# Patient Record
Sex: FEMALE | URBAN - METROPOLITAN AREA
[De-identification: names, ages, dates, MRNs, and addresses within clinical notes are randomized per-mention and may not be internally consistent; named-entity substitution may affect disease eponyms.]

---

## 2022-12-14 ENCOUNTER — OFFICE VISIT (OUTPATIENT)
Dept: INTERNAL MEDICINE CLINIC | Age: 37
End: 2022-12-14
Payer: MEDICAID

## 2022-12-14 VITALS
BODY MASS INDEX: 35.9 KG/M2 | WEIGHT: 256.4 LBS | OXYGEN SATURATION: 97 % | HEART RATE: 78 BPM | HEIGHT: 71 IN | RESPIRATION RATE: 16 BRPM | SYSTOLIC BLOOD PRESSURE: 139 MMHG | TEMPERATURE: 97.1 F | DIASTOLIC BLOOD PRESSURE: 79 MMHG

## 2022-12-14 DIAGNOSIS — Z76.89 ESTABLISHING CARE WITH NEW DOCTOR, ENCOUNTER FOR: Primary | ICD-10-CM

## 2022-12-14 DIAGNOSIS — E66.9 OBESITY, CLASS II, BMI 35-39.9: ICD-10-CM

## 2022-12-14 DIAGNOSIS — B00.1 HERPES LABIALIS: ICD-10-CM

## 2022-12-14 DIAGNOSIS — Z98.84 WEIGHT GAIN FOLLOWING GASTRIC BYPASS SURGERY: ICD-10-CM

## 2022-12-14 DIAGNOSIS — R63.5 WEIGHT GAIN FOLLOWING GASTRIC BYPASS SURGERY: ICD-10-CM

## 2022-12-14 PROCEDURE — 99203 OFFICE O/P NEW LOW 30 MIN: CPT | Performed by: INTERNAL MEDICINE

## 2022-12-14 RX ORDER — VALACYCLOVIR HYDROCHLORIDE 500 MG/1
500 TABLET, FILM COATED ORAL 2 TIMES DAILY
Qty: 6 TABLET | Refills: 2 | Status: SHIPPED | OUTPATIENT
Start: 2022-12-14

## 2022-12-14 NOTE — PROGRESS NOTES
HISTORY OF PRESENT ILLNESS  Estrellita Coronel is a 39 y.o. female. Patient comes to establish PCP. Patient has no complaints today. She has a history of gastric bypass surgery in 2016 lost 130-140 lbs from 340 lbs. Went down to 185 lbs. 2019 she was going to [facility]"  with surgeon who retired. She has not seen a doctor for her weight control since 2019. She is following up with heme-onc for anemia and iron infusions. Hem/Onc in Dec. Follow up appt 5th January. Patient claims to had several blood work done recently this month in heme-onc office. She will ask them to fax to us her records. She is also going to March the records as well. She does not remember what blood work she got done but she mention it was several tests as well as vaccinations. Cold sores  Using valtrex prn episodes with good response    Gastric bypass surgery/abdominoplasty 2016  Weight before surgery 340 pounds, weight after surgery 185 pounds (lowest). Current weight 256 pounds. PAST MEDICAL HISTORY  Medical: Anemia, Hx of by-pass gastric surgery and obesity, cold sores. Surgery: Cholecystectomy and gastric bypass. Allergies: denied, but mentioned bad reaction to some special type of IV iron. Sherrill  Will evaluate next appointment. Establish Care  Pertinent negatives include no chest pain and no shortness of breath. Review of Systems   Constitutional:  Negative for chills and fever. HENT:  Negative for congestion. Respiratory:  Negative for cough and shortness of breath. Cardiovascular:  Negative for chest pain, palpitations and leg swelling. Visit Vitals  /79 (BP 1 Location: Left arm, BP Patient Position: Sitting, BP Cuff Size: Large adult)   Pulse 78   Temp 97.1 °F (36.2 °C) (Temporal)   Resp 16   Ht 5' 11\" (1.803 m)   Wt 256 lb 6.4 oz (116.3 kg)   LMP 11/24/2022   SpO2 97%   BMI 35.76 kg/m²     Physical Exam  Constitutional:       Appearance: She is obese.    HENT: Mouth/Throat:      Mouth: Mucous membranes are moist.      Pharynx: Oropharynx is clear. Eyes:      Extraocular Movements: Extraocular movements intact. Conjunctiva/sclera: Conjunctivae normal.      Pupils: Pupils are equal, round, and reactive to light. Cardiovascular:      Rate and Rhythm: Normal rate and regular rhythm. Pulses: Normal pulses. Heart sounds: Normal heart sounds. Pulmonary:      Effort: Pulmonary effort is normal.      Breath sounds: Normal breath sounds. Abdominal:      General: Bowel sounds are normal.      Palpations: Abdomen is soft. Musculoskeletal:      Cervical back: Normal range of motion. Lymphadenopathy:      Cervical: No cervical adenopathy. Skin:     General: Skin is warm. Neurological:      Mental Status: She is alert and oriented to person, place, and time. Psychiatric:         Mood and Affect: Mood normal.       ASSESSMENT and PLAN    ICD-10-CM ICD-9-CM    1. Establishing care with new doctor, encounter for  Z76.89 V65.8       2. Herpes labialis  B00.1 054.9 valACYclovir (VALTREX) 500 mg tablet      3. Obesity, Class II, BMI 35-39.9  E66.9 278.00 REFERRAL TO DIETITIAN      4. Weight gain following gastric bypass surgery  R63.5 783.1 REFERRAL TO DIETITIAN    Z98.84          Follow-up and Dispositions    Return in about 3 months (around 3/14/2023) for HM, labs work up general..     Patient comes to establish PCP. We will request records patient will have records from heme-onc faxed to us. Valtrex refilled for herpes labialis which usually happens twice a year. Referral to dietitian for weight control after gastric bypass surgery. After reviewing heme-onc test notes and vaccination records we will go ahead and order remaining pending tests for evaluation of obesity and vitamin deficiencies. Follow-up in 3 months.

## 2023-01-10 ENCOUNTER — VIRTUAL VISIT (OUTPATIENT)
Dept: INTERNAL MEDICINE CLINIC | Age: 38
End: 2023-01-10
Payer: MEDICAID

## 2023-01-10 DIAGNOSIS — B96.89 ACUTE BACTERIAL SINUSITIS: Primary | ICD-10-CM

## 2023-01-10 DIAGNOSIS — J01.90 ACUTE BACTERIAL SINUSITIS: Primary | ICD-10-CM

## 2023-01-10 PROCEDURE — 99213 OFFICE O/P EST LOW 20 MIN: CPT | Performed by: INTERNAL MEDICINE

## 2023-01-10 RX ORDER — TRAMADOL HYDROCHLORIDE AND ACETAMINOPHEN 37.5; 325 MG/1; MG/1
1 TABLET, FILM COATED ORAL
Qty: 8 TABLET | Refills: 0 | Status: SHIPPED | OUTPATIENT
Start: 2023-01-10 | End: 2023-01-14

## 2023-01-10 RX ORDER — AMOXICILLIN 875 MG/1
875 TABLET, FILM COATED ORAL 2 TIMES DAILY
Qty: 10 TABLET | Refills: 0 | Status: SHIPPED | OUTPATIENT
Start: 2023-01-10 | End: 2023-01-15

## 2023-01-10 NOTE — PROGRESS NOTES
For virtual visit patient would like to receive link via TEXT/EMAIL: text     Job Paulette is a 44 y.o. female (: 1985) evaluated via telephone on 1/10/2023 to address:    Chief Complaint   Patient presents with    Pressure Behind the Eyes     X 4 days       There were no vitals filed for this visit. Allergies Reviewed. Learning Assessment:   No flowsheet data found. Depression Screening:     3 most recent PHQ Screens 1/10/2023   Little interest or pleasure in doing things Not at all   Feeling down, depressed, irritable, or hopeless Not at all   Total Score PHQ 2 0     Fall Risk Assessment:   No flowsheet data found. Abuse Screening:   No flowsheet data found. Coordination of Care Questionaire:   1. Have you been to the ER, urgent care clinic since your last visit? Hospitalized since your last visit? NO    2. Have you seen or consulted any other health care providers outside of the [address] [place] [address] since your last visit? Include any pap smears or colon screening. YES    Advanced Directive:   1. Do you have an Advanced Directive? NO    2. Would you like information on Advanced Directives?  NO

## 2023-01-10 NOTE — PROGRESS NOTES
Nuria Burnham (: 1985) is a 44 y.o. female, established patient, here for evaluation of the following chief complaint(s):   Pressure Behind the Eyes (X 4 days)     Face hurts (mostly start at the chick bones, right side more than left) and teeth \"kind of hurts\" 5 days ago about the same without changes. Denied fever, but may have presented chills and sweats. Started like a cold with sneezing. A tiny \"tickle in her throat and congestion\". Endorsed yellowish phlegm. Not wearing masks at all times. Flu shot on beginning of Dec 2022. Denied allergies to meds, food, or drinks. I will send treatment for acute sinusitis. She will follow up with me as scheduled, and will let me know if it worsen. If she present any side effect, will stop treatment, go to ED, and let me know. ASSESSMENT/PLAN:  Below is the assessment and plan developed based on review of pertinent history, labs, studies, and medications. 1. Acute bacterial sinusitis  -     amoxicillin (AMOXIL) 875 mg tablet; Take 1 Tablet by mouth two (2) times a day for 5 days. Indications: acute bacterial infection of the sinuses, Normal, Disp-10 Tablet, R-0If swelling, skin rash, abdominal pain, nausea or any other side effect, stop treatment and see ED.  -     traMADol-acetaminophen (ULTRACET) 37.5-325 mg per tablet; Take 1 Tablet by mouth every twelve (12) hours as needed for Pain for up to 4 days. Max Daily Amount: 2 Tablets. Indications: pain, Normal, Disp-8 Tablet, R-0If swelling, skin rash, abdominal pain, nausea or any other side effect, stop treatment and see ED. No follow-ups on file.     SUBJECTIVE/OBJECTIVE:  HPI    Review of Systems     No data recorded     Physical Exam    [INSTRUCTIONS:  \"[x]\" Indicates a positive item  \"[]\" Indicates a negative item  -- DELETE ALL ITEMS NOT EXAMINED]    Constitutional: [x] Appears well-developed and well-nourished [x] No apparent distress      [] Abnormal -     Mental status: [x] Alert and awake [x] Oriented to person/place/time [x] Able to follow commands    [] Abnormal -     Eyes:   EOM    [x]  Normal    [] Abnormal -   Sclera  [x]  Normal    [] Abnormal -          Discharge [x]  None visible   [] Abnormal -     HENT: [x] Normocephalic, atraumatic  [] Abnormal -   [x] Mouth/Throat: Mucous membranes are moist    External Ears [x] Normal  [] Abnormal -    Neck: [x] No visualized mass [] Abnormal -     Pulmonary/Chest: [x] Respiratory effort normal   [x] No visualized signs of difficulty breathing or respiratory distress        [] Abnormal -      Musculoskeletal:   [x] Normal gait with no signs of ataxia         [x] Normal range of motion of neck        [] Abnormal -     Neurological:        [x] No Facial Asymmetry (Cranial nerve 7 motor function) (limited exam due to video visit)          [x] No gaze palsy        [] Abnormal -          Skin:        [x] No significant exanthematous lesions or discoloration noted on facial skin         [] Abnormal -            Psychiatric:       [x] Normal Affect [] Abnormal -        [x] No Hallucinations    Other pertinent observable physical exam findings:-        Daniel Mi, was evaluated through a synchronous (real-time) audio-video encounter. The patient (or guardian if applicable) is aware that this is a billable service, which includes applicable co-pays. This Virtual Visit was conducted with patient's (and/or legal guardian's) consent. The visit was conducted pursuant to the emergency declaration under the [address] [place] [address] authority and the [employer] Act. Patient identification was verified, and a caregiver was present when appropriate. The patient was located at: Home: [address] [place] [address] [facility]  The provider was located at:  Facility (Appt Department): [facility] [address]  [phone]       An electronic signature was used to authenticate this note.   -- Kathleen Benton MD